# Patient Record
Sex: MALE | Race: WHITE
[De-identification: names, ages, dates, MRNs, and addresses within clinical notes are randomized per-mention and may not be internally consistent; named-entity substitution may affect disease eponyms.]

---

## 2020-04-01 ENCOUNTER — HOSPITAL ENCOUNTER (OUTPATIENT)
Dept: HOSPITAL 47 - LABWHC1 | Age: 40
Discharge: HOME | End: 2020-04-01
Attending: FAMILY MEDICINE
Payer: MEDICAID

## 2020-04-01 DIAGNOSIS — R53.83: ICD-10-CM

## 2020-04-01 DIAGNOSIS — Z00.00: Primary | ICD-10-CM

## 2020-04-01 LAB
ALBUMIN SERPL-MCNC: 4.8 G/DL (ref 3.8–4.9)
ALBUMIN/GLOB SERPL: 2.18 G/DL (ref 1.6–3.17)
ALP SERPL-CCNC: 55 U/L (ref 41–126)
ALT SERPL-CCNC: 33 U/L (ref 10–49)
ANION GAP SERPL CALC-SCNC: 8.3 MMOL/L (ref 4–12)
AST SERPL-CCNC: 32 U/L (ref 14–35)
BUN SERPL-SCNC: 17 MG/DL (ref 9–27)
BUN/CREAT SERPL: 21.25 RATIO (ref 12–20)
CALCIUM SPEC-MCNC: 9.5 MG/DL (ref 8.7–10.3)
CHLORIDE SERPL-SCNC: 104 MMOL/L (ref 96–109)
CHOLEST SERPL-MCNC: 175 MG/DL (ref 0–200)
CO2 SERPL-SCNC: 28.7 MMOL/L (ref 21.6–31.8)
ERYTHROCYTE [DISTWIDTH] IN BLOOD BY AUTOMATED COUNT: 5.04 M/UL (ref 4.3–5.9)
ERYTHROCYTE [DISTWIDTH] IN BLOOD: 13.2 % (ref 11.5–15.5)
GLOBULIN SER CALC-MCNC: 2.2 G/DL (ref 1.6–3.3)
GLUCOSE SERPL-MCNC: 86 MG/DL (ref 70–110)
HCT VFR BLD AUTO: 44.6 % (ref 39–53)
HDLC SERPL-MCNC: 48 MG/DL (ref 40–60)
HGB BLD-MCNC: 15 GM/DL (ref 13–17.5)
LDLC SERPL CALC-MCNC: 99 MG/DL (ref 0–131)
MCH RBC QN AUTO: 29.7 PG (ref 25–35)
MCHC RBC AUTO-ENTMCNC: 33.6 G/DL (ref 31–37)
MCV RBC AUTO: 88.5 FL (ref 80–100)
PLATELET # BLD AUTO: 204 K/UL (ref 150–450)
POTASSIUM SERPL-SCNC: 4.2 MMOL/L (ref 3.5–5.5)
PROT SERPL-MCNC: 7 G/DL (ref 6.2–8.2)
SODIUM SERPL-SCNC: 141 MMOL/L (ref 135–145)
TRIGL SERPL-MCNC: 140 MG/DL (ref 0–149)
VIT B12 SERPL-MCNC: 725 PG/ML (ref 200–944)
VLDLC SERPL CALC-MCNC: 28 MG/DL (ref 5–40)
WBC # BLD AUTO: 4.8 K/UL (ref 3.8–10.6)

## 2020-04-01 PROCEDURE — 36415 COLL VENOUS BLD VENIPUNCTURE: CPT

## 2020-04-01 PROCEDURE — 85027 COMPLETE CBC AUTOMATED: CPT

## 2020-04-01 PROCEDURE — 82607 VITAMIN B-12: CPT

## 2020-04-01 PROCEDURE — 80061 LIPID PANEL: CPT

## 2020-04-01 PROCEDURE — 80053 COMPREHEN METABOLIC PANEL: CPT

## 2020-04-01 PROCEDURE — 82306 VITAMIN D 25 HYDROXY: CPT

## 2020-04-01 PROCEDURE — 84443 ASSAY THYROID STIM HORMONE: CPT

## 2021-04-27 ENCOUNTER — HOSPITAL ENCOUNTER (OUTPATIENT)
Dept: HOSPITAL 47 - RADPROMAIN | Age: 41
Discharge: HOME | End: 2021-04-27
Attending: FAMILY MEDICINE
Payer: COMMERCIAL

## 2021-04-27 DIAGNOSIS — K59.89: Primary | ICD-10-CM

## 2021-04-27 DIAGNOSIS — N32.89: ICD-10-CM

## 2021-04-27 PROCEDURE — 74176 CT ABD & PELVIS W/O CONTRAST: CPT

## 2021-04-28 NOTE — CT
EXAMINATION TYPE: CT abdomen pelvis wo con

 

DATE OF EXAM: 4/27/2021

 

HISTORY: RLQ pain, possible hernia.

 

CT DLP: 506 mGycm.  Automated Exposure Control for Dose Reduction was Utilized.

 

TECHNIQUE:  CT scan of the abdomen and pelvis is performed with oral but without IV contrast.

 

COMPARISON: NONE

 

FINDINGS:  Within the limitations of a non-contrast study, the following observations are made.

 

LUNG BASES: No significant abnormality is appreciated.

 

LIVER/GB: Contracted gallbladder.

 

PANCREAS: No significant abnormality is seen.

 

SPLEEN: No significant abnormality is seen.

 

ADRENALS: No significant abnormality is seen.

 

KIDNEYS: There is a thin-walled 3.2 cm simple appearing thin-walled cyst laterally midpole of the rig
ht kidney. There is occasional subcentimeter low dense lesion throughout left kidney too small to fur
ther characterize presumed benign. No renal stones or hydronephrosis identified bilaterally. Bladder 
not greatly distended with mild concentric wall thickening. 

 

BOWEL: Oral contrast does not reach level of the midileum making evaluation of distal bowel loops sub
optimal. Evaluation also suboptimal as patient has very little intra-abdominal fat. No abnormal small
 or large bowel dilatation is seen. Mild-to-moderate diffuse fecal prominence and colon is seen. Ther
e is small bowel feces sign in distal ileal loops. Findings consistent with delayed passage of ingest
ed material to colonic level. Poor visualization of appendix in the right lower quadrant or pelvis wi
thout focal inflammatory change at this level

 

GENITAL ORGANS: Prostate size is upper limits of normal.

 

LYMPH NODES: No greater than 1cm abdominal or pelvic lymph nodes are appreciated.

 

OSSEOUS STRUCTURES: Bilateral pars defect L4 level with grade 1 anterolisthesis L4 on L5. Posterior d
isc herniation L4-L5 level sagittal image 35 effaces anterior thecal sac. Mild to moderate disc space
 narrowing at this level.

 

OTHER: No significant additional abnormality is seen.

 

IMPRESSION:

1. Mild concentric wall thickening in poorly distended bladder, correlate clinically to exclude acute
 cystitis.

2. Small bowel feces sign consistent with delayed passage of ingested material to colonic level. No b
owel obstruction. Mild to moderate diffuse colonic fecal stasis noted.

3. L4-L5 spondylosis and arthropathy as detailed above.